# Patient Record
Sex: MALE | Race: AMERICAN INDIAN OR ALASKA NATIVE | NOT HISPANIC OR LATINO | Employment: UNEMPLOYED | URBAN - METROPOLITAN AREA
[De-identification: names, ages, dates, MRNs, and addresses within clinical notes are randomized per-mention and may not be internally consistent; named-entity substitution may affect disease eponyms.]

---

## 2020-01-15 ENCOUNTER — NON-PROVIDER VISIT (OUTPATIENT)
Dept: URGENT CARE | Facility: CLINIC | Age: 30
End: 2020-01-15

## 2020-01-15 DIAGNOSIS — Z02.1 PRE-EMPLOYMENT DRUG SCREENING: ICD-10-CM

## 2020-01-15 LAB
AMP AMPHETAMINE: NORMAL
BREATH ALCOHOL COMMENT: NEGATIVE
COC COCAINE: NORMAL
INT CON NEG: NORMAL
INT CON POS: NORMAL
MET METHAMPHETAMINES: NORMAL
OPI OPIATES: NORMAL
PCP PHENCYCLIDINE: NORMAL
POC BREATHALIZER: 0 PERCENT (ref 0–0.01)
POC DRUG COMMENT 753798-OCCUPATIONAL HEALTH: NEGATIVE
THC: NORMAL

## 2020-01-15 PROCEDURE — 82075 ASSAY OF BREATH ETHANOL: CPT | Performed by: PHYSICIAN ASSISTANT

## 2020-01-15 PROCEDURE — 80305 DRUG TEST PRSMV DIR OPT OBS: CPT | Performed by: PHYSICIAN ASSISTANT

## 2020-07-06 ENCOUNTER — NON-PROVIDER VISIT (OUTPATIENT)
Dept: URGENT CARE | Facility: PHYSICIAN GROUP | Age: 30
End: 2020-07-06

## 2020-07-06 DIAGNOSIS — Z02.83 ENCOUNTER FOR DRUG SCREENING: ICD-10-CM

## 2020-07-06 PROCEDURE — 80305 DRUG TEST PRSMV DIR OPT OBS: CPT | Performed by: PHYSICIAN ASSISTANT

## 2022-01-26 ENCOUNTER — NON-PROVIDER VISIT (OUTPATIENT)
Dept: URGENT CARE | Facility: PHYSICIAN GROUP | Age: 32
End: 2022-01-26

## 2022-01-26 DIAGNOSIS — Z02.1 PRE-EMPLOYMENT DRUG SCREENING: ICD-10-CM

## 2022-01-26 LAB
AMP AMPHETAMINE: NORMAL
COC COCAINE: NORMAL
INT CON NEG: NORMAL
INT CON POS: NORMAL
MET METHAMPHETAMINES: NORMAL
OPI OPIATES: NORMAL
PCP PHENCYCLIDINE: NORMAL
POC DRUG COMMENT 753798-OCCUPATIONAL HEALTH: NEGATIVE
THC: NORMAL

## 2022-01-26 PROCEDURE — 80305 DRUG TEST PRSMV DIR OPT OBS: CPT | Performed by: PHYSICIAN ASSISTANT

## 2022-07-29 ENCOUNTER — HOSPITAL ENCOUNTER (OUTPATIENT)
Dept: RADIOLOGY | Facility: MEDICAL CENTER | Age: 32
End: 2022-07-29
Attending: PHYSICIAN ASSISTANT
Payer: COMMERCIAL

## 2022-07-29 ENCOUNTER — OCCUPATIONAL MEDICINE (OUTPATIENT)
Dept: URGENT CARE | Facility: PHYSICIAN GROUP | Age: 32
End: 2022-07-29
Payer: OTHER MISCELLANEOUS

## 2022-07-29 VITALS
RESPIRATION RATE: 18 BRPM | OXYGEN SATURATION: 96 % | HEIGHT: 68 IN | HEART RATE: 80 BPM | BODY MASS INDEX: 34.86 KG/M2 | DIASTOLIC BLOOD PRESSURE: 80 MMHG | SYSTOLIC BLOOD PRESSURE: 122 MMHG | WEIGHT: 230 LBS | TEMPERATURE: 98 F

## 2022-07-29 DIAGNOSIS — S49.91XA ARM INJURY, RIGHT, INITIAL ENCOUNTER: ICD-10-CM

## 2022-07-29 DIAGNOSIS — R07.81 RIB PAIN ON RIGHT SIDE: ICD-10-CM

## 2022-07-29 DIAGNOSIS — S22.41XA CLOSED FRACTURE OF MULTIPLE RIBS OF RIGHT SIDE, INITIAL ENCOUNTER: ICD-10-CM

## 2022-07-29 DIAGNOSIS — S43.101A SEPARATION OF RIGHT ACROMIOCLAVICULAR JOINT, INITIAL ENCOUNTER: ICD-10-CM

## 2022-07-29 DIAGNOSIS — Z02.1 PRE-EMPLOYMENT DRUG SCREENING: ICD-10-CM

## 2022-07-29 LAB
AMP AMPHETAMINE: NORMAL
BAR BARBITURATES: NORMAL
BREATH ALCOHOL COMMENT: NORMAL
BZO BENZODIAZEPINES: NORMAL
COC COCAINE: NORMAL
INT CON NEG: NORMAL
INT CON POS: NORMAL
MDMA ECSTASY: NORMAL
MET METHAMPHETAMINES: NORMAL
MTD METHADONE: NORMAL
OPI OPIATES: NORMAL
OXY OXYCODONE: NORMAL
PCP PHENCYCLIDINE: NORMAL
POC BREATHALIZER: 0 PERCENT (ref 0–0.01)
POC URINE DRUG SCREEN OCDRS: NEGATIVE
THC: NORMAL

## 2022-07-29 PROCEDURE — 73030 X-RAY EXAM OF SHOULDER: CPT | Mod: RT

## 2022-07-29 PROCEDURE — 99204 OFFICE O/P NEW MOD 45 MIN: CPT | Performed by: PHYSICIAN ASSISTANT

## 2022-07-29 PROCEDURE — 80305 DRUG TEST PRSMV DIR OPT OBS: CPT | Performed by: PHYSICIAN ASSISTANT

## 2022-07-29 PROCEDURE — 71101 X-RAY EXAM UNILAT RIBS/CHEST: CPT | Mod: RT

## 2022-07-29 PROCEDURE — 73080 X-RAY EXAM OF ELBOW: CPT | Mod: RT

## 2022-07-29 PROCEDURE — 73110 X-RAY EXAM OF WRIST: CPT | Mod: RT

## 2022-07-29 PROCEDURE — 82075 ASSAY OF BREATH ETHANOL: CPT | Performed by: PHYSICIAN ASSISTANT

## 2022-07-29 RX ORDER — HYDROCODONE BITARTRATE AND ACETAMINOPHEN 5; 325 MG/1; MG/1
1 TABLET ORAL EVERY 6 HOURS PRN
Qty: 20 TABLET | Refills: 0 | Status: SHIPPED | OUTPATIENT
Start: 2022-07-29 | End: 2022-08-03

## 2022-07-29 RX ORDER — IBUPROFEN 800 MG/1
800 TABLET ORAL EVERY 8 HOURS PRN
Qty: 30 TABLET | Refills: 0 | Status: SHIPPED | OUTPATIENT
Start: 2022-07-29 | End: 2022-08-22 | Stop reason: SDUPTHER

## 2022-07-29 ASSESSMENT — ENCOUNTER SYMPTOMS
VOMITING: 0
NAUSEA: 0
HEMOPTYSIS: 0
FEVER: 0
CHILLS: 0

## 2022-07-29 NOTE — LETTER
"EMPLOYEE’S CLAIM FOR COMPENSATION/ REPORT OF INITIAL TREATMENT  FORM C-4    EMPLOYEE’S CLAIM - PROVIDE ALL INFORMATION REQUESTED   First Name  Ryley Last Name  Asif Birthdate                    1990                Sex  male Claim Number (Insurer’s Use Only)    Home Address  [address] Age  31 y.o. Height  1.727 m (5' 8\") Weight  104 kg (230 lb) [facility]     [facility] Zip  [address] Telephone  [phone] (home)    Mailing Address  [address] [facility] Zip  [address] Primary Language Spoken  English    Insurer   Third-Party   Misc Workers Comp   Employee's Occupation (Job Title) When Injury or Occupational Disease Occurred      Employer's Name/Company Name  [employer]  Telephone  [phone]    Office Mail Address (Number and Street)   [address]  [place] [facility] [place] [facility]  [address]    Date of Injury  7/29/2022               Hours Injury  10:00 AM Date Employer Notified  7/29/2022 Last Day of Work after Injury     or Occupational Disease  7/29/2022 Supervisor to Whom Injury     Reported  Yennifer Chavez   Address or Location of Accident (if applicable)  [[place], [place]]   What were you doing at the time of accident? (if applicable)  setting pipe    How did this injury or occupational disease occur? (Be specific an answer in detail. Use additional sheet if necessary)  Setting pipe. Then machine came out and hit my arm   If you believe that you have an occupational disease, when did you first have knowledge of the disability and it relationship to your employment?  N/A Witnesses to the Accident  N/A      Nature of Injury or Occupational Disease  Workers' Compensation  Part(s) of Body Injured or Affected  Lower Arm (R), Shoulder (R), N/A    I certify that the above is true and correct to the best of my knowledge and that I have provided this " information in order to obtain the benefits of [place]  I hereby authorize any physician, chiropractor, surgeon, practitioner, or other person, any hospital, including [facility] or [facility], any medical service organization, any insurance company, or other institution or organization to release to each other, any medical or other information, including benefits paid or payable, pertinent to this injury or disease, except information relative to diagnosis, treatment and/or counseling for AIDS, psychological conditions, alcohol or controlled substances, for which I must give specific authorization.  A Photostat of this authorization shall be as valid as the original.     Date   Place Employee’s Original or  *Electronic Signature   THIS REPORT MUST BE COMPLETED AND MAILED WITHIN 3 WORKING DAYS OF TREATMENT   Place  [facility]  Name of Facility  [facility]   Date  7/29/2022 Diagnosis and Description of Injury or Occupational Disease  (S49.91XA) Arm injury, right, initial encounter  (S22.41XA) Closed fracture of multiple ribs of right side, initial encounter  (S43.101A) Separation of right acromioclavicular joint, initial encounter Is there evidence the injured employee was under the influence of alcohol and/or another controlled substance at the time of accident?  ? No ? Yes (if yes, please explain)    Hour  1:31 PM   Diagnoses of Arm injury, right, initial encounter, Closed fracture of multiple ribs of right side, initial encounter, and Separation of right acromioclavicular joint, initial encounter were pertinent to this visit. No   Treatment  Patient was found to have right AC separation as well as rib fractures of the second and third ribs on the right side.  Patient was placed in a shoulder immobilizer and given work restrictions of no use of the right upper extremity.   Referrals to sports medicine and occupational medicine are being placed for further evaluation follow-up.  Patient was prescribed a angiogram ibuprofen tablets as well as Norco tablets to use for more moderate to severe pain.  PDMP was reviewed prior to prescribing the Norco which did not demonstrate any increased risk of abuse.  Patient is advised not to use his medication while driving, operating heavy machinery, or while drinking alcohol.  D 39 and C4 completed.    Have you advised the patient to remain off work five days or     more?    X-Ray Findings  Positive  Comments:Positive for right AC separation as well as right second and third rib fractures   ? Yes Indicate dates:   From   To      From information given by the employee, together with medical evidence, can        you directly connect this injury or occupational disease as job incurred?  Yes ? No If no, is the injured employee capable of:  ? full duty  No ? modified duty  Yes   Is additional medical care by a physician indicated?  Yes If Modified Duty, Specify any Limitations / Restrictions      Do you know of any previous injury or disease contributing to this condition or occupational disease?  ? Yes ? No (Explain if yes)                          No   Date  7/29/2022 Print Health Care Provider's   Angelina Cerrato P.A.-C. I certify the employer’s copy of  this form was mailed on:   Address  [address] Insurer’s Use Only     [facility]  [address]    Provider’s Tax ID Number  [id number] Telephone  Dept: [phone]             Health Care Provider’s Original or Electronic Signature  e-ANGELINA Tristan P.A.-C. Degree (MD,DO, DC,PAChristianoC,APRN)   PAChristianoC      * Complete and attach Release of Information (Form C-4A) when injured employee signs C-4 Form electronically  ORIGINAL - TREATING HEALTHCARE PROVIDER PAGE 2 - INSURER/TPA PAGE 3 - EMPLOYER PAGE 4 - EMPLOYEE             Form C-4 (rev.08/21)           BRIEF DESCRIPTION OF  "RIGHTS AND BENEFITS  (Pursuant to [place] 616C.050)    Notice of Injury or Occupational Disease (Incident Report Form C-1): If an injury or occupational disease (OD) arises out of and in the course of employment, you must provide written notice to your employer as soon as practicable, but no later than 7 days after the accident or OD. Your employer shall maintain a sufficient supply of the required forms.    Claim for Compensation (Form C-4): If medical treatment is sought, the form C-4 is available at the place of initial treatment. A completed \"Claim for Compensation\" (Form C-4) must be filed within 90 days after an accident or OD. The treating physician or chiropractor must, within 3 working days after treatment, complete and mail to the employer, the employer's insurer and third-party , the Claim for Compensation.    Medical Treatment: If you require medical treatment for your on-the-job injury or OD, you may be required to select a physician or chiropractor from a list provided by your workers’ compensation insurer, if it has contracted with an Organization for Managed Care (MCO) or Preferred Provider Organization (PPO) or providers of health care. If your employer has not entered into a contract with an MCO or PPO, you may select a physician or chiropractor from the Panel of Physicians and Chiropractors. Any medical costs related to your industrial injury or OD will be paid by your insurer.    Temporary Total Disability (TTD): If your doctor has certified that you are unable to work for a period of at least 5 consecutive days, or 5 cumulative days in a 20-day period, or places restrictions on you that your employer does not accommodate, you may be entitled to TTD compensation.    Temporary Partial Disability (TPD): If the wage you receive upon reemployment is less than the compensation for TTD to which you are entitled, the insurer may be required to pay you TPD compensation to make up the difference. " TPD can only be paid for a maximum of 24 months.    Permanent Partial Disability (PPD): When your medical condition is stable and there is an indication of a PPD as a result of your injury or OD, within 30 days, your insurer must arrange for an evaluation by a rating physician or chiropractor to determine the degree of your PPD. The amount of your PPD award depends on the date of injury, the results of the PPD evaluation, your age and wage.    Permanent Total Disability (PTD): If you are medically certified by a treating physician or chiropractor as permanently and totally disabled and have been granted a PTD status by your insurer, you are entitled to receive monthly benefits not to exceed 66 2/3% of your average monthly wage. The amount of your PTD payments is subject to reduction if you previously received a lump-sum PPD award.    Vocational Rehabilitation Services: You may be eligible for vocational rehabilitation services if you are unable to return to the job due to a permanent physical impairment or permanent restrictions as a result of your injury or occupational disease.    Transportation and Per Bessei Reimbursement: You may be eligible for travel expenses and per bessie associated with medical treatment.    Reopening: You may be able to reopen your claim if your condition worsens after claim closure.     Appeal Process: If you disagree with a written determination issued by the insurer or the insurer does not respond to your request, you may appeal to the Department of Administration, , by following the instructions contained in your determination letter. You must appeal the determination within 70 days from the date of the determination letter at [address], [address], [place], [place] [address], or [address], [address], [place] [address]. If you disagree with the  decision, you may appeal to the Department of Administration, . You must  file your appeal within 30 days from the date of the  decision letter at [address], [address], [place], [place] [address], or [address], [place], [place] [address]. If you disagree with a decision of an , you may file a petition for judicial review with the District Court. You must do so within 30 days of the Appeal Officer’s decision. You may be represented by an  at your own expense or you may contact the [facility] for possible representation.    [place]  for Injured Workers (NAIW): If you disagree with a  decision, you may request that NAIW represent you without charge at an  Hearing. For information regarding denial of benefits, you may contact the [facility] at: [address], [address], [place] [address], [phone], or [address], [place] [address], [phone]    To File a Complaint with the Division: If you wish to file a complaint with the  of the Division of Industrial Relations (DIR),  please contact the Workers’ Compensation Section, [address], [address], [place], [place] [address], telephone [phone]) [phone], or [address], [place], [place] [address], telephone [phone].    For assistance with Workers’ Compensation Issues: You may contact the [facility] Office for Consumer Health Assistance, [address], [address], [place] [address], Toll Free 1-965.910.8633, Web site: http://[facility].[place].gov/Programs/STEVEN E-mail: steven@[facility].nv.gov              __________________________________________________________________                                    _________________            Employee Name / Signature                                                                                                                            Date                                                                                                                                                                                                                               D-2 (rev. 10/20)

## 2022-07-29 NOTE — LETTER
[facility] [address] [address]  Phone:  [phone] - Fax:  [phone]   [facility] Progress Report and Disability Certification  Date of Service: 7/29/2022   No Show:  No  Date / Time of Next Visit: 8/5/2022 [facility] med   Claim Information   Patient Name: Ryley Gold  Claim Number:     Employer: [employer]  Date of Injury: 7/29/2022     Insurer / TPA: Misc Workers Comp  ID / SSN:     Occupation:   Diagnosis: Diagnoses of Arm injury, right, initial encounter, Closed fracture of multiple ribs of right side, initial encounter, and Separation of right acromioclavicular joint, initial encounter were pertinent to this visit.    Medical Information   Related to Industrial Injury? Yes    Subjective Complaints:  DOI: 7/29/2022    Ryley Gold is a 31 y.o. male who presents today for evaluation of an injury to his right arm.  Patient works as an  for a and [employer].  States that this morning they were trying to move pipe.  Patient was placing a piece of wood into the neck set of pipe so that it could be pushed along with the wood got twisted in the pipe and so they had to back it out using a machine.  The , however, instead of coming straight out came out at an angle and when this happened the machine actually hit patient on the right side of his body.  Brunt of the impact was near his right elbow but he has been having pain diffusely in his right upper extremity as well as his right side of his rib cage.  He is having difficulty moving his arm secondary to pain.  He is right-hand dominant.  Denies any previous injury.  Has not tried any interventions for symptoms yet.     Objective Findings: Pulmonary:      Effort: Pulmonary effort is normal.      Breath sounds: Normal breath sounds. No decreased breath sounds, wheezing, rhonchi or rales.   Chest:      Chest wall: Tenderness present. No swelling  or crepitus.      Comments: Right-sided ribs are diffusely tender to palpation without any overlying erythema or ecchymosis.  No crepitus.  Patient has symmetrical rise and fall the chest wall.  Musculoskeletal:      Comments: Right upper extremity is diffusely tender to palpation with the bulk of tenderness focused around the right elbow.  He has diffuse bony tenderness and soft tissue tenderness around the elbow.  There is also some soft tissue swelling noted around the elbow and the forearm with some mild soft tissue swelling extending into the hand as well.  He has 5/5  strength but notes increased pain with this.  Unable to fully extend his right elbow and unable to abduct his right upper extremity past 70 degrees.  Mild tenderness at the wrist as well patient has full range of motion of the right wrist.  Distal neurovascular status intact.  Cap refill of all fingers is less than 2 seconds.      Pre-Existing Condition(s):     Assessment:   Initial Visit    Status: Additional Care Required  Permanent Disability:No    Plan: DiagnosticsMedication    Diagnostics: X-ray    Comments:  Positive for right AC separation as well as right second and third rib fractures    Disability Information   Status: Released to Restricted Duty    From:  7/29/2022  Through: 8/5/2022 Restrictions are: Temporary   Physical Restrictions   Sitting:    Standing:    Stooping:    Bending:      Squatting:    Walking:    Climbing:    Pushing:      Pulling:    Other:    Reaching Above Shoulder (L):   Reaching Above Shoulder (R):       Reaching Below Shoulder (L):    Reaching Below Shoulder (R):      Not to exceed Weight Limits   Carrying(hrs):   Weight Limit(lb):   Lifting(hrs):   Weight  Limit(lb):     Comments: No use of right upper extremity    Repetitive Actions   Hands: i.e. Fine Manipulations from Grasping:     Feet: i.e. Operating Foot Controls:     Driving / Operate Machinery:     Health Care Provider’s Original or Electronic  Signature  Siria Cerrato P.A.-C. Health Care Provider’s Original or Electronic Signature    Jordi Waller MD         Clinic Name / Location: [address] [address] Clinic Phone Number: Dept: [phone]   Appointment Time: 12:10 Pm Visit Start Time: 1:31 PM   Check-In Time:  12:16 Pm Visit Discharge Time:  3:36 PM   Original-Treating Physician or Chiropractor    Page 2-Insurer/TPA    Page 3-Employer    Page 4-Employee

## 2022-07-29 NOTE — PROGRESS NOTES
Subjective     Ryley Gold is a 31 y.o. male who presents with Arm Injury (Right arm injury at work 10am, Unable to lift arm and hand )      DOI: 7/29/2022    Ryley Gold is a 31 y.o. male who presents today for evaluation of an injury to his right arm.  Patient works as an  for a [employer].  States that this morning they were trying to move pipe.  Patient was placing a piece of wood into the neck set of pipe so that it could be pushed along with the wood got twisted in the pipe and so they had to back it out using a machine.  The , however, instead of coming straight out came out at an angle and when this happened the machine actually hit patient on the right side of his body.  Brunt of the impact was near his right elbow but he has been having pain diffusely in his right upper extremity as well as his right side of his rib cage.  He is having difficulty moving his arm secondary to pain.  He is right-hand dominant.  Denies any previous injury.  Has not tried any interventions for symptoms yet.      Review of Systems   Constitutional: Negative for chills and fever.   Respiratory: Negative for hemoptysis.    Gastrointestinal: Negative for nausea and vomiting.   Musculoskeletal:        Diffuse pain of right upper extremity.  Right-sided rib pain           PMH:  has no past medical history on file.  MEDS: No current outpatient medications on file.  ALLERGIES: No Known Allergies  SURGHX:   Past Surgical History:   Procedure Laterality Date   • PB INJECT DISCOGRAM,LUMBAR,EA LEVEL  5/25/2012    Performed by K[facility]KE OLIVER at [facility]   • PB INJECT DISCOGRAM,LUMBAR,EA LEVEL  5/25/2012    Performed by [facility]KE OLIVER at [facility]   • PB INJECT DISCOGRAM,LUMBAR,EA LEVEL  5/25/2012    Performed by [facility]KE OLIVER at [facility]   • INJ,EPIDURAL/LUMB/SAC SINGLE  3/2/2012    Performed by [facility]KE OLIVER at [facility]     SOCHX:  reports that he has never smoked. His smokeless tobacco use includes chew. He reports current alcohol use of about 0.6 oz of alcohol per week. He reports that he does not use drugs.  FH: Family history was reviewed, no pertinent findings to report      Objective     /80 (BP Location: Left arm, Patient Position: Sitting, BP Cuff Size: Adult)   Pulse 80   Temp 36.7 °C (98 °F) (Temporal)   Resp 18   Ht 1.727 m (5' 8\")   Wt 104 kg (230 lb)   SpO2 96%   BMI 34.97 kg/m²      Physical Exam  Constitutional:       Appearance: He is well-developed.   HENT:      Head: Normocephalic and atraumatic.      Right Ear: External ear normal.      Left Ear: External ear normal.   Eyes:      Conjunctiva/sclera: Conjunctivae normal.      Pupils: Pupils are equal, round, and reactive to light.   Cardiovascular:      Rate and Rhythm: Normal rate and regular rhythm.      Heart sounds: Normal heart sounds. No murmur heard.  Pulmonary:      Effort: Pulmonary effort is normal.      Breath sounds: Normal breath sounds. No decreased breath sounds, wheezing, rhonchi or rales.   Chest:      Chest wall: Tenderness present. No swelling or crepitus.      Comments: Right-sided ribs are diffusely tender to palpation without any overlying erythema or ecchymosis.  No crepitus.  Patient has symmetrical rise and fall the chest wall.  Musculoskeletal:      Comments: Right upper extremity is diffusely tender to palpation with the bulk of tenderness focused around the right elbow.  He has diffuse bony tenderness and soft tissue tenderness around the elbow.  There is also some soft tissue swelling noted around the elbow and the forearm with some mild soft tissue swelling extending into the hand as well.  He has 5/5  strength but notes increased pain with this.  Unable to fully extend his right elbow and unable to abduct his right upper extremity past 70 degrees.  Mild tenderness at the wrist as well patient has full range of " motion of the right wrist.  Distal neurovascular status intact.  Cap refill of all fingers is less than 2 seconds.   Skin:     General: Skin is warm and dry.      Capillary Refill: Capillary refill takes less than 2 seconds.   Neurological:      Mental Status: He is alert and oriented to person, place, and time.   Psychiatric:         Behavior: Behavior normal.         Judgment: Judgment normal.         DX-SHOULDER 2+ RIGHT  FINDINGS:  No acute fracture is identified. The clavicle is superiorly displaced with respect to the acromion. The coracoclavicular distance measures approximately 1.9 cm. No definite fracture is identified.     IMPRESSION:  Age-indeterminate acromioclavicular joint separation    DX-ELBOW-COMPLETE 3+ RIGHT  FINDINGS:  No acute fracture or malalignment.    No soft tissue abnormality. There is no elbow effusion.     IMPRESSION:     No acute fracture.      DX-WRIST-COMPLETE 3+ RIGHT  FINDINGS:  No acute fracture or malalignment. Sclerotic focus in the scaphoid bone is consistent with a bone island. Questionable mild dorsal soft tissue edema.     IMPRESSION:  No acute fracture is identified. If pain persists, recommend repeat imaging in 7-10 days    [id number] (WITH 1-VIEW CXR) RIGHT  IMPRESSION:     Fractures in the right posterolateral second and third ribs.        Assessment & Plan     1. Arm injury, right, initial encounter  - DX-SHOULDER 2+ RIGHT; Future  - DX-ELBOW-COMPLETE 3+ RIGHT; Future  - DX-WRIST-COMPLETE 3+ RIGHT; Future  - ibuprofen (MOTRIN) 800 MG Tab; Take 1 Tablet by mouth every 8 hours as needed for Moderate Pain or Inflammation.  Dispense: 30 Tablet; Refill: 0    2. Closed fracture of multiple ribs of right side, initial encounter  - [id number] (WITH 1-VIEW CXR) RIGHT; Future  - HYDROcodone-acetaminophen (NORCO) 5-325 MG Tab per tablet; Take 1 Tablet by mouth every 6 hours as needed (moderate to severe pain) for up to 5 days.  Dispense: 20 Tablet; Refill: 0  -  ibuprofen (MOTRIN) 800 MG Tab; Take 1 Tablet by mouth every 8 hours as needed for Moderate Pain or Inflammation.  Dispense: 30 Tablet; Refill: 0    3. Separation of right acromioclavicular joint, initial encounter  - HYDROcodone-acetaminophen (NORCO) 5-325 MG Tab per tablet; Take 1 Tablet by mouth every 6 hours as needed (moderate to severe pain) for up to 5 days.  Dispense: 20 Tablet; Refill: 0  - ibuprofen (MOTRIN) 800 MG Tab; Take 1 Tablet by mouth every 8 hours as needed for Moderate Pain or Inflammation.  Dispense: 30 Tablet; Refill: 0      Patient was found to have right AC separation as well as rib fractures of the second and third ribs on the right side.  Patient was placed in a shoulder immobilizer and given work restrictions of no use of the right upper extremity.  Referrals to sports medicine and occupational medicine are being placed for further evaluation follow-up.  Patient was prescribed a angiogram ibuprofen tablets as well as Norco tablets to use for more moderate to severe pain.  PDMP was reviewed prior to prescribing the Norco which did not demonstrate any increased risk of abuse.  Patient is advised not to use his medication while driving, operating heavy machinery, or while drinking alcohol.  D 39 and C4 completed.        My total time spent caring for the patient on the day of the encounter was 50 minutes.   This does not include time spent on separately billable procedures/tests.

## 2022-08-08 ENCOUNTER — OCCUPATIONAL MEDICINE (OUTPATIENT)
Dept: OCCUPATIONAL MEDICINE | Facility: CLINIC | Age: 32
End: 2022-08-08
Payer: OTHER MISCELLANEOUS

## 2022-08-08 VITALS
OXYGEN SATURATION: 97 % | RESPIRATION RATE: 16 BRPM | WEIGHT: 230 LBS | DIASTOLIC BLOOD PRESSURE: 82 MMHG | BODY MASS INDEX: 34.86 KG/M2 | HEIGHT: 68 IN | SYSTOLIC BLOOD PRESSURE: 128 MMHG | HEART RATE: 85 BPM

## 2022-08-08 DIAGNOSIS — S43.101D SEPARATION OF RIGHT ACROMIOCLAVICULAR JOINT, SUBSEQUENT ENCOUNTER: ICD-10-CM

## 2022-08-08 DIAGNOSIS — S22.41XD CLOSED FRACTURE OF MULTIPLE RIBS OF RIGHT SIDE WITH ROUTINE HEALING, SUBSEQUENT ENCOUNTER: ICD-10-CM

## 2022-08-08 PROCEDURE — 99203 OFFICE O/P NEW LOW 30 MIN: CPT | Performed by: PREVENTIVE MEDICINE

## 2022-08-08 NOTE — LETTER
"   [address],   [address] [address]  Phone:  [phone] - Fax:  [phone]   [facility] Progress Report and Disability Certification  Date of Service: 8/8/2022   No Show:  No  Date / Time of Next Visit: 8/22/2022 @1030AM   Claim Information   Patient Name: Ryley Godl  Claim Number:     Employer: [employer]  Date of Injury: 7/29/2022     Insurer / TPA: [employer]  ID / SSN:     Occupation:   Diagnosis: Diagnoses of Closed fracture of multiple ribs of right side with routine healing, subsequent encounter and Separation of right acromioclavicular joint, subsequent encounter were pertinent to this visit.    Medical Information   Related to Industrial Injury? 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  TASHI: He was placing a piece of wood into the neck set of pipe, when a , accidentally hit patient on the right side of his body.  He was seen in urgent care, x-rays of the right wrist and elbow were negative for acute findings.  X-rays of the right ribs showed \"Fractures in the right posterolateral second and third ribs.\"  X-rays of the right shoulder showed \"Age-indeterminate acromioclavicular joint separation. With coracoclavicular distance measures approximately 1.9 cm\"  He was given hydrocodone and referral to orthopedics and occupational medicine.  Patient states overall the right shoulder is significantly improved.  He states he has minimal pain at this point.  He states he has full range of motion of the shoulder.  He states he has very little concerned about the shoulder he states currently the most painful aspect is the rib fractures.  He states the pain ranges from mild to moderate, seems to hurt worse with sneezing, coughing and trying to sleep.  However he feels that he would be able to do his full duty job at this point and would " like to try doing full duty.  He states he is not really taking the hydrocodone at this point and just taking it at night as needed.   Objective Findings: Right Shoulder: No gross deformity.  Full range of motion no pain or difficulty.  Empty can test negative.  Strength intact.  Chest Wall: No gross deform.  Area of pain over the lateral upper chest wall.  Good respiratory effort.   Pre-Existing Condition(s):     Assessment:   Condition Improved    Status: Additional Care Required  Permanent Disability:No    Plan:      Diagnostics:      Comments:  Continue ibuprofen and 100 mg 3 times daily as needed  Hydrocodone use only for breakthrough pain  Okay for trial of full duty  Follow-up 2 weeks, sooner if unable to tolerate full duty    Disability Information   Status: Released to Full Duty    From:  8/8/2022  Through: 8/22/2022 Restrictions are:     Physical Restrictions   Sitting:    Standing:    Stooping:    Bending:      Squatting:    Walking:    Climbing:    Pushing:      Pulling:    Other:    Reaching Above Shoulder (L):   Reaching Above Shoulder (R):       Reaching Below Shoulder (L):    Reaching Below Shoulder (R):      Not to exceed Weight Limits   Carrying(hrs):   Weight Limit(lb):   Lifting(hrs):   Weight  Limit(lb):     Comments:      Repetitive Actions   Hands: i.e. Fine Manipulations from Grasping:     Feet: i.e. Operating Foot Controls:     Driving / Operate Machinery:     Health Care Provider’s Original or Electronic Signature  Florin Nick D.O. Health Care Provider’s Original or Electronic Signature    Jordi Waller MD         Clinic Name / Location: [address] [address] Clinic Phone Number: Dept: [phone]   Appointment Time: 10:30 Am Visit Start Time: 10:33 AM   Check-In Time:  10:21 Am Visit Discharge Time:  1119AM   Original-Treating Physician or Chiropractor    Page 2-Insurer/TPA    Page 3-Employer    Page 4-Employee

## 2022-08-08 NOTE — PROGRESS NOTES
"Subjective:     Ryley Gold is a 31 y.o. male who presents for Follow-Up ([facility] DOI 7/29/22 Rt arm/shoulder, ribs, rm 16)      DOI 32 yo injured worker presents with right shoulder and rib injury.  TASHI: He was placing a piece of wood into the neck set of pipe, when a , accidentally hit patient on the right side of his body.  He was seen in urgent care, x-rays of the right wrist and elbow were negative for acute findings.  X-rays of the right ribs showed \"Fractures in the right posterolateral second and third ribs.\"  X-rays of the right shoulder showed \"Age-indeterminate acromioclavicular joint separation. With coracoclavicular distance measures approximately 1.9 cm\"  He was given hydrocodone and referral to orthopedics and occupational medicine.  Patient states overall the right shoulder is significantly improved.  He states he has minimal pain at this point.  He states he has full range of motion of the shoulder.  He states he has very little concerned about the shoulder he states currently the most painful aspect is the rib fractures.  He states the pain ranges from mild to moderate, seems to hurt worse with sneezing, coughing and trying to sleep.  However he feels that he would be able to do his full duty job at this point and would like to try doing full duty.  He states he is not really taking the hydrocodone at this point and just taking it at night as needed.    ROS: All systems were reviewed on intake form, form was reviewed and signed. See scanned documents in media. Pertinent positives and negatives included in HPI.    PMH: No pertinent past medical history to this problem  MEDS: Medications were reviewed in Epic  ALLERGIES: No Known Allergies  SOCHX: Works as an  at "[employer]  FH: No pertinent family history to this problem       Objective:     /82   Pulse 85   Resp 16   Ht 1.727 m (5' 8\")   Wt 104 kg (230 lb)   SpO2 97%   BMI 34.97 kg/m²     Constitutional: " Patient is in no acute distress. Appears well-developed and well-nourished.   HENT: Normocephalic and atraumatic. EOM are normal. No scleral icterus.   Cardiovascular: Normal rate.    Pulmonary/Chest: Effort normal. No respiratory distress.   Neurological: Patient is alert and oriented to person, place, and time.   Skin: Skin is warm and dry.   Psychiatric: Normal mood and affect. Behavior is normal.     Right Shoulder: No gross deformity.  Full range of motion no pain or difficulty.  Empty can test negative.  Strength intact.  Chest Wall: No gross deform.  Area of pain over the lateral upper chest wall.  Good respiratory effort.    Assessment/Plan:       1. Closed fracture of multiple ribs of right side with routine healing, subsequent encounter    2. Separation of right acromioclavicular joint, subsequent encounter    Released to Full Duty FROM 8/8/2022 TO 8/22/2022     Continue ibuprofen and 100 mg 3 times daily as needed  Hydrocodone use only for breakthrough pain  Okay for trial of full duty  Follow-up 2 weeks, sooner if unable to tolerate full duty    Differential diagnosis, natural history, supportive care, and indications for immediate follow-up discussed.    Approximately 35 minutes were spent in reviewing notes, preparing for visit, obtaining history, exam and evaluation, patient counseling/education and post visit documentation/orders.

## 2022-08-22 ENCOUNTER — OCCUPATIONAL MEDICINE (OUTPATIENT)
Dept: OCCUPATIONAL MEDICINE | Facility: CLINIC | Age: 32
End: 2022-08-22
Payer: OTHER MISCELLANEOUS

## 2022-08-22 VITALS
WEIGHT: 230 LBS | RESPIRATION RATE: 18 BRPM | OXYGEN SATURATION: 97 % | HEART RATE: 90 BPM | BODY MASS INDEX: 34.86 KG/M2 | SYSTOLIC BLOOD PRESSURE: 120 MMHG | HEIGHT: 68 IN | DIASTOLIC BLOOD PRESSURE: 76 MMHG

## 2022-08-22 DIAGNOSIS — S43.101A SEPARATION OF RIGHT ACROMIOCLAVICULAR JOINT, INITIAL ENCOUNTER: ICD-10-CM

## 2022-08-22 DIAGNOSIS — S22.41XD CLOSED FRACTURE OF MULTIPLE RIBS OF RIGHT SIDE WITH ROUTINE HEALING, SUBSEQUENT ENCOUNTER: ICD-10-CM

## 2022-08-22 DIAGNOSIS — S22.41XA CLOSED FRACTURE OF MULTIPLE RIBS OF RIGHT SIDE, INITIAL ENCOUNTER: ICD-10-CM

## 2022-08-22 DIAGNOSIS — S49.91XA ARM INJURY, RIGHT, INITIAL ENCOUNTER: ICD-10-CM

## 2022-08-22 PROCEDURE — 99213 OFFICE O/P EST LOW 20 MIN: CPT | Performed by: PREVENTIVE MEDICINE

## 2022-08-22 RX ORDER — IBUPROFEN 800 MG/1
800 TABLET ORAL EVERY 8 HOURS PRN
Qty: 30 TABLET | Refills: 0 | Status: SHIPPED | OUTPATIENT
Start: 2022-08-22

## 2022-08-22 NOTE — PROGRESS NOTES
"Subjective:     Ryley Gold is a 32 y.o. male who presents for Follow-Up ([facility] DOI 7/29/22 rt arm/shoulder, same, rm 16)      DOI 7/29/2022: 32 yo injured worker presents with right shoulder and rib injury.  TASHI: He was placing a piece of wood into the neck set of pipe, when a , accidentally hit patient on the right side of his body.  X-rays of the right wrist and elbow were negative for acute findings.  X-rays of the right ribs showed \"Fractures in the right posterolateral second and third ribs.\"  X-rays of the right shoulder showed \"Age-indeterminate acromioclavicular joint separation. With coracoclavicular distance measures approximately 1.9 cm\" patient states that overall symptoms symptoms are about the same.  He states that he does continue to have right-sided rib pain going into the back.  He states area still sensitive to touch.  He states he did have a flareup when his daughter actually landed on his back causing more symptoms.  However he does state he has been able to work full duty with only mild aggravation of symptoms.  Feels like he can continue to do so.    ROS         Objective:     /76   Pulse 90   Resp 18   Ht 1.727 m (5' 8\")   Wt 104 kg (230 lb)   SpO2 97%   BMI 34.97 kg/m²     Constitutional: Patient is in no acute distress. Appears well-developed and well-nourished.   Cardiovascular: Normal rate.    Pulmonary/Chest: Effort normal. No respiratory distress.   Neurological: Patient is alert and oriented to person, place, and time.   Skin: Skin is warm and dry.   Psychiatric: Normal mood and affect. Behavior is normal.     Right Shoulder: No gross deformity.  Full range of motion no pain or difficulty.   Chest Wall: No gross deformity.  Area of pain extends from anterior upper chest wall to posterior thorax.  Good respiratory effort.    Assessment/Plan:       1. Closed fracture of multiple ribs of right side with routine healing, subsequent encounter    2. Arm injury, " right, initial encounter  - ibuprofen (MOTRIN) 800 MG Tab; Take 1 Tablet by mouth every 8 hours as needed for Moderate Pain or Inflammation.  Dispense: 30 Tablet; Refill: 0    3. Closed fracture of multiple ribs of right side, initial encounter  - ibuprofen (MOTRIN) 800 MG Tab; Take 1 Tablet by mouth every 8 hours as needed for Moderate Pain or Inflammation.  Dispense: 30 Tablet; Refill: 0    4. Separation of right acromioclavicular joint, initial encounter  - ibuprofen (MOTRIN) 800 MG Tab; Take 1 Tablet by mouth every 8 hours as needed for Moderate Pain or Inflammation.  Dispense: 30 Tablet; Refill: 0    Released to Full Duty FROM 8/22/2022 TO 9/19/2022       Continue ibuprofen and remote x3 times daily as needed  Heat/ice as needed  Full duty  Follow-up 4 weeks, sooner if needed    Differential diagnosis, natural history, supportive care, and indications for immediate follow-up discussed.    Approximately 25 minutes was spent in preparing for visit, obtaining history, exam and evaluation, patient counseling/education and post visit documentation/orders.

## 2022-08-22 NOTE — LETTER
"[address],   [address] [address]  Phone:  [phone] - Fax:  [phone]   [facility] Progress Report and Disability Certification  Date of Service: 8/22/2022   No Show:  No  Date / Time of Next Visit: 9/19/2022 @10:30am   Claim Information   Patient Name: Ryley Gold  Claim Number:     Employer: [employer]  Date of Injury: 7/29/2022     Insurer / TPA: [employer] Workers [employer]  ID / SSN:     Occupation:   Diagnosis: Diagnoses of Closed fracture of multiple ribs of right side with routine healing, subsequent encounter, Arm injury, right, initial encounter, Closed fracture of multiple ribs of right side, initial encounter, and Separation of right acromioclavicular joint, initial encounter were pertinent to this visit.    Medical Information   Related to Industrial Injury? Yes    Subjective Complaints:  DOI 7/29/2022: 30 yo injured worker presents with right shoulder and rib injury.  TASHI: He was placing a piece of wood into the neck set of pipe, when a , accidentally hit patient on the right side of his body.  X-rays of the right wrist and elbow were negative for acute findings.  X-rays of the right ribs showed \"Fractures in the right posterolateral second and third ribs.\"  X-rays of the right shoulder showed \"Age-indeterminate acromioclavicular joint separation. With coracoclavicular distance measures approximately 1.9 cm\" patient states that overall symptoms symptoms are about the same.  He states that he does continue to have right-sided rib pain going into the back.  He states area still sensitive to touch.  He states he did have a flareup when his daughter actually landed on his back causing more symptoms.  However he does state he has been able to work full duty with only mild aggravation of symptoms.  Feels like he can continue to do so.   Objective Findings: Right Shoulder: No gross deformity.  Full " range of motion no pain or difficulty.   Chest Wall: No gross deformity.  Area of pain extends from anterior upper chest wall to posterior thorax.  Good respiratory effort.   Pre-Existing Condition(s):     Assessment:   Condition Same    Status: Additional Care Required  Permanent Disability:No    Plan:      Diagnostics:      Comments:  Continue ibuprofen and remote x3 times daily as needed  Heat/ice as needed  Full duty  Follow-up 4 weeks, sooner if needed    Disability Information   Status: Released to Full Duty    From:  8/22/2022  Through: 9/19/2022 Restrictions are:     Physical Restrictions   Sitting:    Standing:    Stooping:    Bending:      Squatting:    Walking:    Climbing:    Pushing:      Pulling:    Other:    Reaching Above Shoulder (L):   Reaching Above Shoulder (R):       Reaching Below Shoulder (L):    Reaching Below Shoulder (R):      Not to exceed Weight Limits   Carrying(hrs):   Weight Limit(lb):   Lifting(hrs):   Weight  Limit(lb):     Comments:      Repetitive Actions   Hands: i.e. Fine Manipulations from Grasping:     Feet: i.e. Operating Foot Controls:     Driving / Operate Machinery:     Health Care Provider’s Original or Electronic Signature  Florin Nick D.O. Health Care Provider’s Original or Electronic Signature    Jordi Waller MD         Clinic Name / Location: [address],   [address] [place] [address] Clinic Phone Number: Dept: [phone]   Appointment Time: 10:30 Am Visit Start Time: 10:19 AM   Check-In Time:  10:16 Am Visit Discharge Time:  1051am   Original-Treating Physician or Chiropractor    Page 2-Insurer/TPA    Page 3-Employer    Page 4-Employee